# Patient Record
Sex: MALE | Race: WHITE | HISPANIC OR LATINO | Employment: FULL TIME | URBAN - METROPOLITAN AREA
[De-identification: names, ages, dates, MRNs, and addresses within clinical notes are randomized per-mention and may not be internally consistent; named-entity substitution may affect disease eponyms.]

---

## 2018-06-29 ENCOUNTER — HOSPITAL ENCOUNTER (EMERGENCY)
Facility: MEDICAL CENTER | Age: 28
End: 2018-06-29
Attending: EMERGENCY MEDICINE

## 2018-06-29 VITALS
TEMPERATURE: 98.5 F | HEIGHT: 72 IN | OXYGEN SATURATION: 100 % | DIASTOLIC BLOOD PRESSURE: 72 MMHG | BODY MASS INDEX: 16.01 KG/M2 | RESPIRATION RATE: 18 BRPM | SYSTOLIC BLOOD PRESSURE: 112 MMHG | HEART RATE: 99 BPM | WEIGHT: 118.17 LBS

## 2018-06-29 DIAGNOSIS — M25.572 ACUTE BILATERAL ANKLE PAIN: ICD-10-CM

## 2018-06-29 DIAGNOSIS — M25.571 ACUTE BILATERAL ANKLE PAIN: ICD-10-CM

## 2018-06-29 PROCEDURE — A9270 NON-COVERED ITEM OR SERVICE: HCPCS | Performed by: EMERGENCY MEDICINE

## 2018-06-29 PROCEDURE — 99284 EMERGENCY DEPT VISIT MOD MDM: CPT

## 2018-06-29 PROCEDURE — 700102 HCHG RX REV CODE 250 W/ 637 OVERRIDE(OP): Performed by: EMERGENCY MEDICINE

## 2018-06-29 RX ORDER — IBUPROFEN 600 MG/1
600 TABLET ORAL ONCE
Status: COMPLETED | OUTPATIENT
Start: 2018-06-29 | End: 2018-06-29

## 2018-06-29 RX ADMIN — IBUPROFEN 600 MG: 600 TABLET, FILM COATED ORAL at 13:15

## 2018-06-29 ASSESSMENT — PAIN SCALES - GENERAL
PAINLEVEL_OUTOF10: 6
PAINLEVEL_OUTOF10: 10

## 2018-06-29 NOTE — ED PROVIDER NOTES
"ED Provider Note    CHIEF COMPLAINT  Chief Complaint   Patient presents with   • Ankle Pain     bilateral ankle pain \"from walking so much\", denies trauma, no swelling/deformity noted       HPI  Leoncio Johnston is a 28 y.o. male who presents with bilateral ankle discomfort. The patient states over the last couple of days he's had bilateral ankle pain. He states he attributed this to walking around as he is currently homeless. He has not had any direct injury. He does not have any proximal leg pain. He is unaware of any fevers.    REVIEW OF SYSTEMS  No other musculoskeletal complaints    PHYSICAL EXAM  VITAL SIGNS: /82   Pulse (!) 102   Temp 37.1 °C (98.8 °F) (Temporal)   Resp 16   Ht 1.829 m (6')   Wt 53.6 kg (118 lb 2.7 oz)   SpO2 97%   BMI 16.03 kg/m²   In general patient does not appear toxic  Extremities the patient does not have any erythema nor ecchymosis surrounding the ankles. He does not have any swelling. He does have diffuse discomfort to palpation with no clear source. He does not have any skeletal deformities and he has full flexion and extension at the ankle with apparent discomfort but no loss of range of motion. He has bilateral normal midfoot and knee exams.  Skin no erythema nor induration  Neurovascular examination is grossly intact bilaterally      COURSE & MEDICAL DECISION MAKING  Pertinent Labs & Imaging studies reviewed. (See chart for details)  This a 28-year-old male who presents with bilateral ankle pain. I don't see a clear source. In further speaking with the patient I think this is more of a social issue as the patient wants a place to stay. Therefore we'll have social work evaluate the patient. The patient received Motrin for any possible inflammation. He is instructed to follow-up with the [facility] for routine health maintenance repeat examination next week.    FINAL IMPRESSION  1. Bilateral ankle pain  2. Homeless       Disposition  The patient will be discharged " in stable condition      Electronically signed by: Behzad Navarrete, 6/29/2018 1:12 PM

## 2018-06-29 NOTE — DISCHARGE PLANNING
CM met with pt to discuss needs. All pt would say is he wants 'a safe place'. He was very quiet and vague during conversation. He states family in area but they have distanced themselves from him. He does have family in [place] and would possibly like to go see them. CM [place]. He also states he has been at the homeless shelter but he doesn't feel safe there. CM offered other homeless resources but pt declined.

## 2018-06-29 NOTE — ED NOTES
On assessment the patient states that is both ankle are hurting.  He states that his ankles hurt from walking to much.

## 2018-06-29 NOTE — DISCHARGE INSTRUCTIONS
Ankle Pain  Many things can cause ankle pain, including an injury to the area and overuse of the ankle. The ankle joint holds your body weight and allows you to move around. Ankle pain can occur on either side or the back of one ankle or both ankles. Ankle pain may be sharp and burning or dull and aching. There may be tenderness, stiffness, redness, or warmth around the ankle.  Follow these instructions at home:  Activity  · Rest your ankle as told by your health care provider. Avoid any activities that cause ankle pain.  · Do exercises as told by your health care provider.  · Ask your health care provider if you can drive.  Using a brace, a bandage, or crutches  · If you were given a brace:  ¨ Wear it as told by your health care provider.  ¨ Remove it when you take a bath or a shower.  ¨ Try not to move your ankle very much, but wiggle your toes from time to time. This helps to prevent swelling.  · If you were given an elastic bandage:  ¨ Remove it when you take a bath or a shower.  ¨ Try not to move your ankle very much, but wiggle your toes from time to time. This helps to prevent swelling.  ¨ Adjust the bandage to make it more comfortable if it feels too tight.  ¨ Loosen the bandage if you have numbness or tingling in your foot or if your foot turns cold and blue.  · If you have crutches, use them as told by your health care provider. Continue to use them until you can walk without feeling pain in your ankle.  Managing pain, stiffness, and swelling  · Raise (elevate) your ankle above the level of your heart while you are sitting or lying down.  · If directed, apply ice to the area:  ¨ Put ice in a plastic bag.  ¨ Place a towel between your skin and the bag.  ¨ Leave the ice on for 20 minutes, 2-3 times per day.  General instructions  · Keep all follow-up visits as told by your health care provider. This is important.  · Record this information that may be helpful for you and your health care provider:  ¨ How  often you have ankle pain.  ¨ Where the pain is located.  ¨ What the pain feels like.  · Take over-the-counter and prescription medicines only as told by your health care provider.  Contact a health care provider if:  · Your pain gets worse.  · Your pain is not relieved with medicines.  · You have a fever or chills.  · You are having more trouble with walking.  · You have new symptoms.  Get help right away if:  · Your foot, leg, toes, or ankle tingles or becomes numb.  · Your foot, leg, toes, or ankle becomes swollen.  · Your foot, leg, toes, or ankle turns pale or blue.  This information is not intended to replace advice given to you by your health care provider. Make sure you discuss any questions you have with your health care provider.  Document Released: 06/07/2011 Document Revised: 08/18/2017 Document Reviewed: 07/19/2016  [employer] Interactive Patient Education © 2017 [employer]

## 2018-06-29 NOTE — ED TRIAGE NOTES
".  Chief Complaint   Patient presents with   • Ankle Pain     bilateral ankle pain \"from walking so much\", denies trauma, no swelling/deformity noted     ./82   Pulse (!) 102   Temp 37.1 °C (98.8 °F) (Temporal)   Resp 16   Ht 1.829 m (6')   Wt 53.6 kg (118 lb 2.7 oz)   SpO2 97%   BMI 16.03 kg/m²     Ambulatory to triage with above complaints, educated on triage process, placed in lobby, told to inform staff of any changes in condition.    "

## 2018-06-29 NOTE — ED NOTES
Waiting on Social Work consult before discharge per doctor.  Patient has been medicated and understands plan of care

## 2019-12-15 ENCOUNTER — HOSPITAL ENCOUNTER (EMERGENCY)
Facility: MEDICAL CENTER | Age: 29
End: 2019-12-15
Attending: EMERGENCY MEDICINE

## 2019-12-15 VITALS
OXYGEN SATURATION: 98 % | RESPIRATION RATE: 18 BRPM | SYSTOLIC BLOOD PRESSURE: 122 MMHG | TEMPERATURE: 98.2 F | DIASTOLIC BLOOD PRESSURE: 78 MMHG | HEART RATE: 77 BPM

## 2019-12-15 DIAGNOSIS — A64 STD (MALE): ICD-10-CM

## 2019-12-15 PROCEDURE — 99283 EMERGENCY DEPT VISIT LOW MDM: CPT

## 2019-12-16 NOTE — ED TRIAGE NOTES
Pt comes in reporting he was treated for a STD in [place] and here to follow up. Pt stating he feels better, denies symptoms now.

## 2019-12-16 NOTE — ED NOTES
Agree with triage note.     Received orders for DC. Denies any symptoms at all. Aware that if symptoms worsen or return then he should f/u with [facility]. VSS. Ambulatory to lobby.

## 2019-12-16 NOTE — DISCHARGE INSTRUCTIONS
Please return to the emergency department or follow-up with the primary care physician if you have worsening symptoms.

## 2019-12-16 NOTE — ED PROVIDER NOTES
ER Provider Note     Scribed for Jonathan Skaggs M.D. by Scooby Menon. 12/15/2019, 6:04 PM.    Primary Care Provider: None noted  Means of Arrival: Walk in   History obtained from: Patient  History limited by: None     CHIEF COMPLAINT  Chief Complaint   Patient presents with   • Follow-Up       HPI  Leoncio Johnston is a 29 y.o. male who presents to the Emergency Department for follow up after recently being treated for STDs while he was in [place]. Patient states he was treated for chlamydia and gonorrhea. States he received a shot and was prescribed a course of medication, which he thinks was doxycycline. He has finished the medication and states he is feeling much improved. States symptoms have overall resolved. Denies any dysuria or testicular pain. States he was advised to follow up with his PCP but came to the ED due to recently moving. He denies any other acute medical complaints at this time.     REVIEW OF SYSTEMS  See HPI for further details.    PAST MEDICAL HISTORY   has a past medical history of Anxiety and Psychiatric disorder.    SURGICAL HISTORY  patient denies any surgical history    SOCIAL HISTORY  Social History     Tobacco Use   • Smoking status: Current Every Day Smoker     Packs/day: 1.00     Types: Cigarettes   • Tobacco comment: 2 pack/week   Substance Use Topics   • Alcohol use: Yes     Comment: socially   • Drug use: No      Social History     Substance and Sexual Activity   Drug Use No       FAMILY HISTORY  No pertinent family history reported.     CURRENT MEDICATIONS  Home Medications     Reviewed by Tana Segura R.N. (Registered Nurse) on 12/15/19 at 1722  Med List Status: Complete   Medication Last Dose Status        Patient Dwayne Taking any Medications                       ALLERGIES  No Known Allergies    PHYSICAL EXAM  VITAL SIGNS: /82   Pulse 76   Temp 36.9 °C (98.4 °F) (Temporal)   Resp 16   SpO2 98%    Constitutional: Alert in no apparent distress.  HENT:  Normocephalic, Atraumatic, Bilateral external ears normal. Nose normal.   Eyes: Pupils are equal and reactive. Conjunctiva normal, non-icteric.   Heart: Regular rate and rythm, no murmurs.    Lungs: Clear to auscultation bilaterally.  Skin: Warm, Dry, No erythema, No rash.   Neurologic: Alert, Grossly non-focal.   Psychiatric: Affect normal, Judgment normal, Mood normal, Appears appropriate and not intoxicated.     COURSE & MEDICAL DECISION MAKING  Pertinent Labs & Imaging studies reviewed. (See chart for details)    This is a 29 y.o. male that presents with being treated for gonorrhea and chlamydia.  The patient symptoms have diminished significantly.  He denies any discharge, burning or pain.  At this time it appears as if his infection was treated adequately.  I will discharge home with follow-up and strict return precautions..     6:04 PM - Patient seen and examined at bedside. Patient was recently treated for chlamydia and gonorrhea while he was in [place]. Symptoms have resolved, and he is feeling significantly improved. Given that he has completed the appropriate treatment for the STDs, informed patient there are no further tests or interventions indicated at this time. The patient will be discharged. Instructions were given for follow-up. Discussed indications for seeking immediate medical attention. Patient was given the opportunity for questions. The patient understands and agrees.      The patient will return for new or worsening symptoms and is stable at the time of discharge.    The patient is referred to a primary physician for blood pressure management, diabetic screening, and for all other preventative health concerns.    DISPOSITION:  Patient will be discharged home in stable condition.    FOLLOW UP:  [address] [address]  [phone]  Go in 2 days  If symptoms worsen       FINAL IMPRESSION  1. STD (male)          Scooby GRANT (Scribe), lily scribing  for, and in the presence of, Jonathan Skaggs M.D..    Electronically signed by: Scooby Menon (Scribe), 12/15/2019    I, Jonathan Skaggs M.D. personally performed the services described in this documentation, as scribed by Scooby Menon in my presence, and it is both accurate and complete. E    The note accurately reflects work and decisions made by me.  Jonathan Skaggs  12/15/2019  11:04 PM

## 2019-12-27 ENCOUNTER — HOSPITAL ENCOUNTER (EMERGENCY)
Facility: MEDICAL CENTER | Age: 29
End: 2019-12-27
Attending: EMERGENCY MEDICINE

## 2019-12-27 VITALS
HEIGHT: 72 IN | SYSTOLIC BLOOD PRESSURE: 127 MMHG | HEART RATE: 91 BPM | WEIGHT: 125 LBS | DIASTOLIC BLOOD PRESSURE: 88 MMHG | RESPIRATION RATE: 18 BRPM | BODY MASS INDEX: 16.93 KG/M2 | OXYGEN SATURATION: 99 % | TEMPERATURE: 97.3 F

## 2019-12-27 DIAGNOSIS — Z20.2 STD EXPOSURE: ICD-10-CM

## 2019-12-27 DIAGNOSIS — J06.9 UPPER RESPIRATORY TRACT INFECTION, UNSPECIFIED TYPE: ICD-10-CM

## 2019-12-27 LAB
EKG IMPRESSION: NORMAL
FLUAV RNA SPEC QL NAA+PROBE: NEGATIVE
FLUBV RNA SPEC QL NAA+PROBE: NEGATIVE

## 2019-12-27 PROCEDURE — 700111 HCHG RX REV CODE 636 W/ 250 OVERRIDE (IP): Performed by: EMERGENCY MEDICINE

## 2019-12-27 PROCEDURE — 93005 ELECTROCARDIOGRAM TRACING: CPT

## 2019-12-27 PROCEDURE — 700102 HCHG RX REV CODE 250 W/ 637 OVERRIDE(OP): Performed by: EMERGENCY MEDICINE

## 2019-12-27 PROCEDURE — 93005 ELECTROCARDIOGRAM TRACING: CPT | Performed by: EMERGENCY MEDICINE

## 2019-12-27 PROCEDURE — 99284 EMERGENCY DEPT VISIT MOD MDM: CPT

## 2019-12-27 PROCEDURE — 87502 INFLUENZA DNA AMP PROBE: CPT

## 2019-12-27 PROCEDURE — A9270 NON-COVERED ITEM OR SERVICE: HCPCS | Performed by: EMERGENCY MEDICINE

## 2019-12-27 RX ORDER — ONDANSETRON 4 MG/1
4 TABLET, ORALLY DISINTEGRATING ORAL ONCE
Status: COMPLETED | OUTPATIENT
Start: 2019-12-27 | End: 2019-12-27

## 2019-12-27 RX ORDER — METRONIDAZOLE 500 MG/1
2000 TABLET ORAL ONCE
Status: COMPLETED | OUTPATIENT
Start: 2019-12-27 | End: 2019-12-27

## 2019-12-27 RX ADMIN — ONDANSETRON 4 MG: 4 TABLET, ORALLY DISINTEGRATING ORAL at 13:15

## 2019-12-27 RX ADMIN — METRONIDAZOLE 2000 MG: 500 TABLET ORAL at 13:15

## 2019-12-27 ASSESSMENT — LIFESTYLE VARIABLES
TOTAL SCORE: 0
HAVE YOU EVER FELT YOU SHOULD CUT DOWN ON YOUR DRINKING: NO
TOTAL SCORE: 0
DO YOU DRINK ALCOHOL: NO
HAVE PEOPLE ANNOYED YOU BY CRITICIZING YOUR DRINKING: NO
TOTAL SCORE: 0
EVER HAD A DRINK FIRST THING IN THE MORNING TO STEADY YOUR NERVES TO GET RID OF A HANGOVER: NO
EVER FELT BAD OR GUILTY ABOUT YOUR DRINKING: NO
CONSUMPTION TOTAL: INCOMPLETE

## 2019-12-27 NOTE — ED PROVIDER NOTES
ED Provider Note    CHIEF COMPLAINT  Chief Complaint   Patient presents with   • Chest Pain     congested, cough productive dark green; last 2 days   • Shortness of Breath     2days    • Exposure to STD     would like to be checked for trichomonos        HPI  Leoncio Johnston is a 29 y.o. male who presents to the emergency department with 2 complaints.  First the patient has had a sore throat cough productive with green sputum for the last 2 days but no fever.  In addition to this he said that his wife was diagnosed with trichomonas infection 3 days ago she is currently undergoing treatment and he would like to be treated because of his exposure.    REVIEW OF SYSTEMS no penile discharge or lesions.  No hemoptysis no vomiting or diarrhea no fever.  All other systems negative    PAST MEDICAL HISTORY  Past Medical History:   Diagnosis Date   • Anxiety    • Psychiatric disorder        FAMILY HISTORY  History reviewed. No pertinent family history.    SOCIAL HISTORY  Social History     Socioeconomic History   • Marital status: Single     Spouse name: Not on file   • Number of children: Not on file   • Years of education: Not on file   • Highest education level: Not on file   Occupational History   • Not on file   Social Needs   • Financial resource strain: Not on file   • Food insecurity:     Worry: Not on file     Inability: Not on file   • Transportation needs:     Medical: Not on file     Non-medical: Not on file   Tobacco Use   • Smoking status: Current Every Day Smoker     Packs/day: 1.00     Types: Cigarettes   • Smokeless tobacco: Never Used   • Tobacco comment: 2 pack/week   Substance and Sexual Activity   • Alcohol use: Yes     Comment: occ   • Drug use: No   • Sexual activity: Not on file   Lifestyle   • Physical activity:     Days per week: Not on file     Minutes per session: Not on file   • Stress: Not on file   Relationships   • Social connections:     Talks on phone: Not on file     Gets together: Not on  file     Attends Yazdanism service: Not on file     Active member of club or organization: Not on file     Attends meetings of clubs or organizations: Not on file     Relationship status: Not on file   • Intimate partner violence:     Fear of current or ex partner: Not on file     Emotionally abused: Not on file     Physically abused: Not on file     Forced sexual activity: Not on file   Other Topics Concern   • Not on file   Social History Narrative   • Not on file       SURGICAL HISTORY  History reviewed. No pertinent surgical history.    CURRENT MEDICATIONS  Home Medications     Reviewed by Kasey Stevens R.N. (Registered Nurse) on 12/27/19 at 1121  Med List Status: Complete   Medication Last Dose Status        Patient Dwayne Taking any Medications                       ALLERGIES  No Known Allergies    PHYSICAL EXAM  VITAL SIGNS: /88   Pulse 91   Temp 36.3 °C (97.3 °F) (Oral)   Resp 18   Ht 1.829 m (6')   Wt 56.7 kg (125 lb)   SpO2 99%   BMI 16.95 kg/m²    Oxygen saturation is interpreted as adequate  Constitutional: Awake nontoxic-appearing  HENT: Mucous membranes are moist and throat clear  Eyes: No erythema discharge or jaundice  Neck: There is tender bilateral lymphadenopathy no meningeal findings  Cardiovascular: Regular rate and rhythm  Lungs: Clear and equal bilaterally with no apparent difficulty breathing  Skin: Warm and dry  Musculoskeletal: No acute bony deformity  Neurologic: Awake verbal moving all those extremities    Laboratory  Influenza testing is negative    MEDICAL DECISION MAKING and DISPOSITION  In the emergency department the patient was given oral Zofran to prevent vomiting and then given 2 g of oral Flagyl for exposure to trichomonas.  His influenza testing is negative clinically he appears to have an upper respiratory tract infection.  At this point in time I think it is safe for him to go home I have advised him to call the [facility] clinic in the morning Monday and arrange a  primary care doctor and office follow-up next week.  If he feels he is developing new or worsening symptoms he is to return here    IMPRESSION  1.  STD exposure ( trichomonas)  2.  Upper respiratory tract infection         Electronically signed by: Vasquez Bearden, 12/27/2019 1:35 PM

## 2019-12-27 NOTE — DISCHARGE INSTRUCTIONS
Use Tylenol and Motrin if needed for fever or discomfort and drink lots of fluids to maintain hydration.  Return here if you experience new or worsening symptoms.

## 2019-12-27 NOTE — ED TRIAGE NOTES
Pt ambulated to triage with   Chief Complaint   Patient presents with   • Chest Pain     congested, cough productive dark green; last 2 days   • Shortness of Breath     2days    • Exposure to STD     would like to be checked for trichomonos      Called for EKG.  Pt Informed regarding triage process and verbalized understanding to inform triage tech or RN for any changes in condition. Placed in lobby.

## 2020-01-14 ENCOUNTER — HOSPITAL ENCOUNTER (EMERGENCY)
Facility: MEDICAL CENTER | Age: 30
End: 2020-01-14
Attending: EMERGENCY MEDICINE

## 2020-01-14 VITALS
RESPIRATION RATE: 16 BRPM | WEIGHT: 129.41 LBS | HEIGHT: 72 IN | BODY MASS INDEX: 17.53 KG/M2 | OXYGEN SATURATION: 99 % | SYSTOLIC BLOOD PRESSURE: 128 MMHG | DIASTOLIC BLOOD PRESSURE: 90 MMHG | HEART RATE: 88 BPM | TEMPERATURE: 97.5 F

## 2020-01-14 DIAGNOSIS — L03.031 PARONYCHIA OF GREAT TOE, RIGHT: ICD-10-CM

## 2020-01-14 DIAGNOSIS — L60.0 INGROWN TOENAIL: ICD-10-CM

## 2020-01-14 PROCEDURE — 99283 EMERGENCY DEPT VISIT LOW MDM: CPT

## 2020-01-14 PROCEDURE — 700102 HCHG RX REV CODE 250 W/ 637 OVERRIDE(OP): Performed by: EMERGENCY MEDICINE

## 2020-01-14 PROCEDURE — A9270 NON-COVERED ITEM OR SERVICE: HCPCS | Performed by: EMERGENCY MEDICINE

## 2020-01-14 RX ORDER — BUPIVACAINE HYDROCHLORIDE 5 MG/ML
10 INJECTION, SOLUTION EPIDURAL; INTRACAUDAL ONCE
Status: DISCONTINUED | OUTPATIENT
Start: 2020-01-14 | End: 2020-01-14 | Stop reason: HOSPADM

## 2020-01-14 RX ORDER — CEPHALEXIN 500 MG/1
500 CAPSULE ORAL ONCE
Status: COMPLETED | OUTPATIENT
Start: 2020-01-14 | End: 2020-01-14

## 2020-01-14 RX ORDER — CEPHALEXIN 500 MG/1
500 CAPSULE ORAL 3 TIMES DAILY
Qty: 21 CAP | Refills: 0 | Status: SHIPPED | OUTPATIENT
Start: 2020-01-14 | End: 2020-01-21

## 2020-01-14 RX ORDER — LIDOCAINE HYDROCHLORIDE 20 MG/ML
20 INJECTION, SOLUTION INFILTRATION; PERINEURAL ONCE
Status: DISCONTINUED | OUTPATIENT
Start: 2020-01-14 | End: 2020-01-14 | Stop reason: HOSPADM

## 2020-01-14 RX ADMIN — CEPHALEXIN 500 MG: 500 CAPSULE ORAL at 20:38

## 2020-01-15 NOTE — ED PROVIDER NOTES
"ED Provider Note    Scribed for Dr. Albin Corrales M.D. by Pietro Childs. 1/14/2020  7:52 PM    Primary care provider: Pcp Not In Computer  Means of arrival: Walk-in  History obtained from: Patient  History limited by: None    CHIEF COMPLAINT  Chief Complaint   Patient presents with   • Toe Pain       HPI  Leoncio Johnston is a 29 y.o. male who presents to the Emergency Department for acute, worsening right great toe pain with skin discoloration onset 2 days ago. Patient states that his toe suddenly \"turned green\" and is painful. There are no known alleviating or exacerbating factors. Denies any associated numbness/tingling. Patient denies any history of ingrown toenails or diabetes.     REVIEW OF SYSTEMS  Pertinent positives include right great toe pain and discoloration. Pertinent negatives include no numbness/tingling. As above, all other systems reviewed and are negative.   See HPI for further details.     PAST MEDICAL HISTORY   has a past medical history of Anxiety and Psychiatric disorder.    SURGICAL HISTORY  patient denies any surgical history    SOCIAL HISTORY  Social History     Tobacco Use   • Smoking status: Current Every Day Smoker     Packs/day: 1.00     Types: Cigarettes   • Smokeless tobacco: Never Used   • Tobacco comment: 2 pack/week   Substance Use Topics   • Alcohol use: Yes     Comment: occ   • Drug use: No      Social History     Substance and Sexual Activity   Drug Use No       FAMILY HISTORY  No family history on file.    CURRENT MEDICATIONS  Home Medications     Reviewed by Kun Box R.N. (Registered Nurse) on 01/14/20 at 1758  Med List Status: Not Addressed   Medication Last Dose Status   ARIPiprazole (ABILIFY PO)  Active                ALLERGIES  No Known Allergies    PHYSICAL EXAM  VITAL SIGNS: /92   Pulse 92   Temp 36.4 °C (97.5 °F) (Temporal)   Resp 16   Ht 1.829 m (6')   Wt 58.7 kg (129 lb 6.6 oz)   SpO2 99%   BMI 17.55 kg/m²     Constitutional: Well developed, " Well nourished, no acute distress, Non-toxic appearance.      Skin: Warm, Dry, No erythema, No rash.   Extremities:. Ingrown toenail to right great toe with associated erythema paronychia noted but no fluctuance. No cyanosis.   Musculoskeletal: No tenderness to palpation or major deformities noted.  Intact distal pulses  Neurologic: Awake, alert. Moves all extremities spontaneously.  Psychiatric: Affect normal, Judgment normal, Mood normal.     COURSE & MEDICAL DECISION MAKING  Pertinent Labs & Imaging studies reviewed. (See chart for details)    7:52 PM - Patient seen and examined at bedside. Patient is requesting to return at a later date to have his toenail removed, as he has to work tomorrow and his job requires closed toed shoes. I informed him that I would prescribed him Kelfex, and otherwise cleared him for discharge at this time. Patient was understanding and agreeable to plan of care.    Decision Making:  I advised definitive treatment of this with removal of toenail.  The patient initially wished to have this done but then decided that he would not be able to work the following day with shoes on which is required in his job.  He is going to return he states in a couple days when he has some time off.  He does not wish the toenail removal to be done today.  I have gone ahead and prescribe some Keflex since he is got a significant paronychia, although I think the underlying problem is still ingrown nail which would benefit from removal    The patient will return for new or worsening symptoms and is stable at the time of discharge.    The patient is referred to a primary physician for blood pressure management, diabetic screening, and for all other preventative health concerns.    DISPOSITION:  Patient will be discharged home in stable condition.    FOLLOW UP:  No follow-up provider specified.    OUTPATIENT MEDICATIONS:  Discharge Medication List as of 1/14/2020  8:24 PM      START taking these medications     Details   cephALEXin (KEFLEX) 500 MG Cap Take 1 Cap by mouth 3 times a day for 7 days., Disp-21 Cap, R-0, Print Rx Paper             FINAL IMPRESSION  1. Ingrown toenail    2. Paronychia of great toe, right         IAlbin M.D. personally performed the services described in this documentation, as scribed by Pietro Childs in my presence, and it is both accurate and complete.    C.    The note accurately reflects work and decisions made by me.  Albin Corrales M.D.  1/14/2020  9:34 PM

## 2020-01-15 NOTE — DISCHARGE INSTRUCTIONS
You can return at any time if you wish the toenail removed, will probably not be able to wear shoes for couple days afterwards

## 2020-01-15 NOTE — ED NOTES
Pt ambulatory to room with steady gait. Attached to monitor, changed into gown, call bell in reach.   Agree with triage note.

## 2020-01-15 NOTE — ED NOTES
Pt medicated per MAR. Pt discharged home. Explained discharge and medication instructions. Questions and comments addressed. Pt verbalized understanding of instructions. Pt advised to follow-up with PCP or return to ED for any new or worsening of symptoms. Pt is ambulating well and steady on feet. VS stable.

## 2020-01-15 NOTE — ED TRIAGE NOTES
"28 y/o male ambulatory to triage with c/o pain to his right great toe. Pt states \"it's turning green\". Discoloration noted to the toenail. Pt denies injury.   "

## 2020-01-17 ENCOUNTER — HOSPITAL ENCOUNTER (EMERGENCY)
Facility: MEDICAL CENTER | Age: 30
End: 2020-01-17
Attending: EMERGENCY MEDICINE

## 2020-01-17 VITALS
HEART RATE: 75 BPM | BODY MASS INDEX: 17.08 KG/M2 | RESPIRATION RATE: 14 BRPM | OXYGEN SATURATION: 96 % | DIASTOLIC BLOOD PRESSURE: 77 MMHG | WEIGHT: 126.1 LBS | SYSTOLIC BLOOD PRESSURE: 113 MMHG | HEIGHT: 72 IN | TEMPERATURE: 98.6 F

## 2020-01-17 DIAGNOSIS — L60.0 INGROWN TOENAIL: ICD-10-CM

## 2020-01-17 DIAGNOSIS — B35.1 TINEA UNGUIUM: ICD-10-CM

## 2020-01-17 PROCEDURE — 99284 EMERGENCY DEPT VISIT MOD MDM: CPT

## 2020-01-17 RX ORDER — ARIPIPRAZOLE 5 MG/1
5 TABLET ORAL DAILY
Qty: 30 TAB | Refills: 0 | Status: SHIPPED | OUTPATIENT
Start: 2020-01-17 | End: 2020-10-01

## 2020-01-17 NOTE — ED TRIAGE NOTES
"30 y/o male ambulatory to triage with c/o toe pain. Pt states he was seen here the other day for the same, he was told that the toe nail needs to be removed. Pt states he was given an antibiotic here (one tab) but did not receive a prescription for the medication. Pt also requesting a refill of his antipsychotic medication. 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   "

## 2020-01-17 NOTE — ED PROVIDER NOTES
ED Provider Note    Scribed for Michael Dyson M.D. by Casper Francois. 1/17/2020, 1:31 PM.    Primary care provider: Pcp Not In Computer  Means of arrival: Walk in  History obtained from: Patient  History limited by: None    CHIEF COMPLAINT  Chief Complaint   Patient presents with   • Toe Pain   • Off Psych Meds     x 30 days       HPI  Leoncio Johnston is a 29 y.o. male who presents to the Emergency Department for a prescription refill. The patient states that he needs a refill on his Abilify 5 mg. He was prescribed Abilify for psychosis. He notes that he just moved here from [place] and does not have a doctor here to fill the prescription. The last time he saw the prescribing doctor was two months go. The patient also endorses right great toe pain, but denies any fever or chills. The pain is worsened when he is walking. No alleviating factors were stated.     REVIEW OF SYSTEMS  See HPI above.     PAST MEDICAL HISTORY   has a past medical history of Anxiety, Psychiatric disorder, and Psychosis (HCC).    SURGICAL HISTORY  patient denies any surgical history    SOCIAL HISTORY  Social History     Tobacco Use   • Smoking status: Current Every Day Smoker     Packs/day: 1.00     Types: Cigarettes   • Smokeless tobacco: Never Used   • Tobacco comment: 2 pack/week   Substance Use Topics   • Alcohol use: Yes     Comment: occ   • Drug use: No      Social History     Substance and Sexual Activity   Drug Use No       FAMILY HISTORY  History reviewed. No pertinent family history.    CURRENT MEDICATIONS  Home Medications     Reviewed by Kun Box R.N. (Registered Nurse) on 01/17/20 at 1226  Med List Status: <None>   Medication Last Dose Status   ARIPiprazole (ABILIFY PO)  Active   cephALEXin (KEFLEX) 500 MG Cap  Active                ALLERGIES  No Known Allergies    PHYSICAL EXAM  VITAL SIGNS: /80   Pulse (!) 102   Temp 37 °C (98.6 °F) (Oral)   Resp 16   Ht 1.829 m (6')   Wt 57.2 kg (126 lb 1.7 oz)   SpO2  98%   BMI 17.10 kg/m²     Constitutional: Well developed, Well nourished, No acute distress, Non-toxic appearance.     Skin: Warm, Dry, No erythema,   Back: No tenderness, No CVA tenderness.  Musculoskeletal: Good range of motion in all major joints. No tenderness to palpation or major deformities noted. Intact distal pulses, no clubbing, no cyanosis, no edema, patient's right great toe on the lateral side there is a slight ingrown toenail.  The patient does have some tinea unguium but no signs of significant erythema or other abnormalities.  Neurologic: Alert & oriented x 3, Moving all extremities. No gross abnormalities.    Psychiatric: Affect normal, Judgment normal, Mood normal.     COURSE & MEDICAL DECISION MAKING  Pertinent Labs & Imaging studies reviewed. (See chart for details)    1:31 PM - Patient seen and examined at bedside. I told the patient that is exam reveals that he has an ingrown toenail as well as a foot fungus. He should soak his toe two times a day and establish a PCP to hand the toe fungus and the Abilify. Patient was given chance to ask questions.      Decision Making:  Patient does have tinea unguium to his toe with what he is most concerned about.  Explained to the patient the treatment for this would be a long-term treatment with oral antifungals.  But with the slight ingrown toenail recommended spacers to his toe he should soak his toe in warm water 2 times a day and allow for the rest of the toenail to grow out.  Should there be any increasing redness or abnormalities such as an infection he should return back to the ED for reevaluation.  Patient also requested a refill on his Abilify.  Clinically the patient otherwise appears well I recommended for the patient to follow-up with outpatient psychiatry and primary I will have our [facility] people give the patient instructions.     The patient will return for new or worsening symptoms and is stable at the time of  discharge.    DISPOSITION:  Patient will be discharged home in stable condition.    FOLLOW UP:  [address] [id number] [phone]        [address] [address]  [phone]          OUTPATIENT MEDICATIONS:  Discharge Medication List as of 1/17/2020  2:52 PM            FINAL IMPRESSION  1. Ingrown toenail    2. Tinea unguium          ICasper (Scribe), am scribing for, and in the presence of, Michael Dyson M.D..    Electronically signed by: Casper Francois (Scribe), 1/17/2020    IMichael M.D. personally performed the services described in this documentation, as scribed by Casper Francois in my presence, and it is both accurate and complete.  E  The note accurately reflects work and decisions made by me.  Michael Dyson M.D.  1/17/2020  8:25 PM

## 2020-01-17 NOTE — ED NOTES
Pt states that he understands to use resources provided to him to obtain prescription on a regular basis. Patient also verbalizes understanding of the discharge instructions

## 2020-01-17 NOTE — DISCHARGE PLANNING
ALERT team  note:  Per [facility] Dr. Dyson's request, writer RN reviewed community MH resources with pt, with written information given, including [facility], [facility], [facility] health clinic; no active insurance plan; pt verbalized understanding; no SI, HI, or self-harm ideation noted; writer RN updated Dr. Dyson  Pt received RX for Ability 5 mg PO daily

## 2020-01-17 NOTE — ED NOTES
Patient states he is out of abilify and needs a refill, great toe on right foot, patient states toenail need removal

## 2020-06-05 ENCOUNTER — HOSPITAL ENCOUNTER (EMERGENCY)
Facility: MEDICAL CENTER | Age: 30
End: 2020-06-05
Attending: EMERGENCY MEDICINE

## 2020-06-05 VITALS
TEMPERATURE: 97.5 F | BODY MASS INDEX: 16.87 KG/M2 | RESPIRATION RATE: 18 BRPM | SYSTOLIC BLOOD PRESSURE: 110 MMHG | DIASTOLIC BLOOD PRESSURE: 68 MMHG | WEIGHT: 124.56 LBS | OXYGEN SATURATION: 98 % | HEART RATE: 94 BPM | HEIGHT: 72 IN

## 2020-06-05 DIAGNOSIS — Z76.0 MEDICATION REFILL: ICD-10-CM

## 2020-06-05 DIAGNOSIS — L71.9 ROSACEA: ICD-10-CM

## 2020-06-05 PROCEDURE — 99282 EMERGENCY DEPT VISIT SF MDM: CPT

## 2020-06-05 RX ORDER — TRIAMCINOLONE ACETONIDE 0.25 MG/G
CREAM TOPICAL
Qty: 15 G | Refills: 0 | Status: SHIPPED | OUTPATIENT
Start: 2020-06-05 | End: 2020-10-01

## 2020-06-05 SDOH — HEALTH STABILITY: MENTAL HEALTH: HOW OFTEN DO YOU HAVE 6 OR MORE DRINKS ON ONE OCCASION?: LESS THAN MONTHLY

## 2020-06-05 SDOH — HEALTH STABILITY: MENTAL HEALTH: HOW OFTEN DO YOU HAVE A DRINK CONTAINING ALCOHOL?: 2-3 TIMES A WEEK

## 2020-06-05 SDOH — HEALTH STABILITY: MENTAL HEALTH: HOW MANY STANDARD DRINKS CONTAINING ALCOHOL DO YOU HAVE ON A TYPICAL DAY?: 1 OR 2

## 2020-06-05 NOTE — ED PROVIDER NOTES
ED Provider Note    CHIEF COMPLAINT  Chief Complaint   Patient presents with   • Medication Refill     Needs Trimelizone cream prescription refill; pt states he has been using this for rosacea since he was 14 y.o.       HPI  Leoncio Johnston is a 30 y.o. male who presents requesting a refill for his triamcinolone facial cream.  Has been using this for nearly 2 decades for rosacea.  He gets a rash across his face that gets worse if he is in the sun or if he is gets very hot.  Patient has recently left and relocated to [place].  Is not yet established primary care doctor.  He presents here hoping for refill of his triamcinolone.  He denies any fever or chills.  There is no other complaint.    PAST MEDICAL HISTORY  Past Medical History:   Diagnosis Date   • Anxiety    • Psychiatric disorder    • Psychosis (HCC)    • Rosacea        FAMILY HISTORY  History reviewed. No pertinent family history.    SOCIAL HISTORY  Social History     Tobacco Use   • Smoking status: Current Every Day Smoker     Packs/day: 1.00     Types: Cigarettes   • Smokeless tobacco: Current User   • Tobacco comment: 2 pack/week   Substance Use Topics   • Alcohol use: Yes     Frequency: 2-3 times a week     Drinks per session: 1 or 2     Binge frequency: Less than monthly   • Drug use: No         SURGICAL HISTORY  History reviewed. No pertinent surgical history.    CURRENT MEDICATIONS    I have reviewed the nurses notes and/or the list brought with the patient.    ALLERGIES  No Known Allergies    REVIEW OF SYSTEMS  See HPI for further details. Review of systems as above, facial rash    PHYSICAL EXAM  VITAL SIGNS: /68   Pulse 94   Temp 36.4 °C (97.5 °F)   Resp 18   Ht 1.829 m (6')   Wt 56.5 kg (124 lb 9 oz)   SpO2 98%   BMI 16.89 kg/m²     Constitutional: Well appearing patient in no acute distress.  Not toxic, nor ill in appearance.  HENT: Mucus membranes moist.  Oropharynx is clear.  There is some slight scaling erythema over his nose and  his maxillary face bilaterally    MEDICAL RECORD  I have reviewed patient's medical record and pertinent results are listed above.    COURSE & MEDICAL DECISION MAKING  I have reviewed any medical record information, laboratory studies and radiographic results as noted above.  Patient presents with some redness across his face which could be consistent with rosacea which is what he says he has had since he was 14.  He is been using steroid cream since that time for flares of rosacea.  He thinks he is having a flare now.  I think it is reasonable to go ahead and refill his steroid cream.  He is uses quite sparingly.  It is essential that he establish with a primary care doctor here in [facility] [address].  Specifically recommended the [facility] clinic and the [facility].  Instructions on rosacea.    FINAL IMPRESSION  1. Rosacea    2. Medication refill           This dictation was created using voice recognition software.    Electronically signed by: Zeke Couch M.D., 6/5/2020 11:25 AM

## 2020-06-05 NOTE — ED TRIAGE NOTES
Chief Complaint   Patient presents with   • Medication Refill     Needs Trimelizone cream prescription refill; pt states he has been using this for rosacea since he was 14 y.o.       Pt ambulatory to triage for above complaint. Pt states his PCM is in [place] and he just moved here a couple months ago so he does not have a local doctor to refill his meds.     Pt is alert/oriented and follows commands. Pt speaking in full sentences and responds appropriately to questions. No acute distress noted in triage and respirations are even and unlabored.     Pt placed in lobby and educated on triage process. Pt encouraged to alert staff for any changes in condition.

## 2020-10-01 ENCOUNTER — HOSPITAL ENCOUNTER (EMERGENCY)
Facility: MEDICAL CENTER | Age: 30
End: 2020-10-01
Attending: EMERGENCY MEDICINE
Payer: MEDICAID

## 2020-10-01 VITALS
RESPIRATION RATE: 17 BRPM | SYSTOLIC BLOOD PRESSURE: 132 MMHG | BODY MASS INDEX: 16.93 KG/M2 | HEART RATE: 84 BPM | TEMPERATURE: 96.9 F | DIASTOLIC BLOOD PRESSURE: 80 MMHG | WEIGHT: 125 LBS | OXYGEN SATURATION: 98 % | HEIGHT: 72 IN

## 2020-10-01 DIAGNOSIS — R25.1 EPISODE OF SHAKING: ICD-10-CM

## 2020-10-01 DIAGNOSIS — R25.1 OCCASIONAL TREMORS: ICD-10-CM

## 2020-10-01 DIAGNOSIS — Z76.0 MEDICATION REFILL: ICD-10-CM

## 2020-10-01 PROCEDURE — 99283 EMERGENCY DEPT VISIT LOW MDM: CPT

## 2020-10-01 RX ORDER — TRIAMCINOLONE ACETONIDE 0.25 MG/G
1 CREAM TOPICAL DAILY
Qty: 15 G | Refills: 1 | Status: SHIPPED | OUTPATIENT
Start: 2020-10-01

## 2020-10-01 RX ORDER — TRIAMCINOLONE ACETONIDE 0.25 MG/G
1 CREAM TOPICAL DAILY
COMMUNITY
End: 2020-10-01 | Stop reason: SDUPTHER

## 2020-10-01 NOTE — ED TRIAGE NOTES
"Pt ambulatory to room with ems c/o feeling as thought he is going to have a sz. Pt states had a sz approx 5 years ago when he \"passed out\" at table. Pt states has never seen neurologist or taken meds for this. Pt denies pain. Nad. GCS15  "

## 2020-10-01 NOTE — ED PROVIDER NOTES
ED Provider Note    Scribed for Jamie Goff M.D. by Amber David. 10/1/2020  7:38 AM    Primary care provider: Pcp Not In Computer  Means of arrival: EMS  History obtained from: EMS and Patient  History limited by: None    CHIEF COMPLAINT  Chief Complaint   Patient presents with   • Other       HPI  Leoncio Johnston is a 30 y.o. male who presents to the Emergency Department for evaluation following one episode seizure-like activity this morning while he was drinking coffee 30 minutes ago. He endorses associated shaking and trembling in his bilateral legs, and feeling near syncopal. He denies any fatigue. 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 He reports a history of seizure 5 years ago. He suspects his symptoms may be related to the weather being cooler recently. No alleviating factors attempted. The patient does not take any daily medications.     REVIEW OF SYSTEMS  Pertinent positives include seizure-like, shaking and trembling in bilateral legs, and near syncopal.   Pertinent negatives include no fatigue.    All other systems reviewed and negative. See HPI for further details.       PAST MEDICAL HISTORY   has a past medical history of Anxiety, Psychiatric disorder, Psychosis (HCC), and Rosacea.    SURGICAL HISTORY  patient denies any surgical history    SOCIAL HISTORY  Social History     Tobacco Use   • Smoking status: Current Every Day Smoker     Packs/day: 1.00     Types: Cigarettes   • Smokeless tobacco: Current User   • Tobacco comment: 2 pack/week   Substance Use Topics   • Alcohol use: Yes     Frequency: 2-3 times a week     Drinks per session: 1 or 2     Binge frequency: Less than monthly   • Drug use: No      Social History     Substance and Sexual Activity   Drug Use No       FAMILY HISTORY  History reviewed. No pertinent family history.    CURRENT MEDICATIONS  Home Medications     Reviewed by Lori Reina (Pharmacy Tech) on 10/01/20 at 0805   Med List Status: Complete   Medication Last Dose Status   triamcinolone acetonide (KENALOG) 0.025 % Cream 9/27/2020 Active                ALLERGIES  No Known Allergies    PHYSICAL EXAM  VITAL SIGNS: /76   Pulse 85   Temp 36.1 °C (96.9 °F) (Temporal)   Resp 16   Ht 1.829 m (6')   Wt 56.7 kg (125 lb)   SpO2 97%   BMI 16.95 kg/m²     Nursing note and vitals reviewed.  Constitutional: Well-developed. Thin, No distress.   HENT: Head is normocephalic and atraumatic. Oropharynx is clear and moist without exudate or erythema.   Eyes: Pupils are equal, round, and reactive to light. Conjunctiva are normal.   Cardiovascular: Normal rate and regular rhythm. No murmur heard. Normal radial pulses.  Pulmonary/Chest: Breath sounds normal. No wheezes or rales.   Abdominal: Soft and non-tender. No distention    Musculoskeletal: Extremities exhibit normal range of motion without edema or tenderness.   Neurological: Awake, alert and oriented to person, place, and time. No focal deficits noted.  Skin: Skin is warm and dry. No rash.   Psychiatric: Normal mood and affect. Appropriate for clinical situation.    COURSE & MEDICAL DECISION MAKING  Nursing notes, VS, PMSFHx reviewed in chart.     Review of past medical records shows the patient was here June 2020 for a medication refill. He has a history of psychiatric disease.     7:38 AM - Patient seen and examined at bedside. He is in no distress, and no pain. His history is not consistent with seizure activity, and he has become tremulous for unclear reasons. Patient suspects it may be due to the cold weather outside, and that he could have just been shivering. I updated him on the plan of care including PO challenge. I advised him of all return precautions. Patient verbalizes understanding and agreement to this plan of care. Patient is requesting a refill of Kenalog cream. He will be sent home with this prescription.     The patient will return for new or worsening symptoms  and is stable at the time of discharge.    DISPOSITION:  Patient will be discharged home in stable condition.    FOLLOW UP:  [facility], Emergency Dept  [address] [facility] [address] [phone]    If symptoms worsen    [facility]  [address] [address]  [phone]  Schedule an appointment as soon as possible for a visit         FINAL IMPRESSION  1. Occasional tremors    2. Episode of shaking    3. Medication refill          IAmber (Leeibleanna), am scribing for, and in the presence of, Jamie Goff M.D..    Electronically signed by: Amber Hitchcock), 10/1/2020    Jamie GRANT M.D. personally performed the services described in this documentation, as scribed by Amber David in my presence, and it is both accurate and complete. C.    The note accurately reflects work and decisions made by me.  Jamie Goff M.D.  10/1/2020  11:19 AM

## 2020-10-14 VITALS
RESPIRATION RATE: 18 BRPM | SYSTOLIC BLOOD PRESSURE: 103 MMHG | OXYGEN SATURATION: 96 % | HEART RATE: 96 BPM | BODY MASS INDEX: 16.93 KG/M2 | DIASTOLIC BLOOD PRESSURE: 72 MMHG | TEMPERATURE: 98.6 F | HEIGHT: 72 IN | WEIGHT: 125 LBS

## 2020-10-14 PROCEDURE — 93005 ELECTROCARDIOGRAM TRACING: CPT

## 2020-10-14 PROCEDURE — 93005 ELECTROCARDIOGRAM TRACING: CPT | Performed by: EMERGENCY MEDICINE

## 2020-10-14 PROCEDURE — 99284 EMERGENCY DEPT VISIT MOD MDM: CPT

## 2020-10-15 ENCOUNTER — HOSPITAL ENCOUNTER (EMERGENCY)
Facility: MEDICAL CENTER | Age: 30
End: 2020-10-15
Attending: EMERGENCY MEDICINE
Payer: MEDICAID

## 2020-10-15 DIAGNOSIS — R44.0 HEARING VOICES: ICD-10-CM

## 2020-10-15 DIAGNOSIS — Z76.0 MEDICATION REFILL: ICD-10-CM

## 2020-10-15 LAB — EKG IMPRESSION: NORMAL

## 2020-10-15 PROCEDURE — 700102 HCHG RX REV CODE 250 W/ 637 OVERRIDE(OP): Performed by: EMERGENCY MEDICINE

## 2020-10-15 PROCEDURE — A9270 NON-COVERED ITEM OR SERVICE: HCPCS | Performed by: EMERGENCY MEDICINE

## 2020-10-15 RX ORDER — HALOPERIDOL 5 MG/1
5 TABLET ORAL ONCE
Status: COMPLETED | OUTPATIENT
Start: 2020-10-15 | End: 2020-10-15

## 2020-10-15 RX ADMIN — HALOPERIDOL 5 MG: 5 TABLET ORAL at 01:45

## 2020-10-15 NOTE — ED PROVIDER NOTES
ED Provider Note    Scribed for Kannan Sim M.D. by Alphonso Franco. 10/15/2020  1:15 AM    CHIEF COMPLAINT  Chief Complaint   Patient presents with   • Tingling     bilateral hands x 30 min PTA via EMS. CMS intact. Denies cardiac Hx/stroke   • Chest Pain     x last 3 seconds in triage per pt       HPI  Leoncio Johnston is a 30 y.o. male who presents to the ED via ambulance for a refill of Aripiprazole. He notes he has been prescribed it before for psychosis symptoms, but he has been out for over a month. He is unsure of how much he was on before. Patient reports auditory hallucinations (relatively new). Patient denies any visual hallucinations, SI, HI, current chest pain, vomiting, or diarrhea. He denies any alcohol or drug use. He denies any history of Diabetes or blood pressure issues.     Per EMS, patient called an ambulance for evaluation of tingling in bilateral hands 30 minutes prior to arrival. Per EMS, CMS is intact and patient denied cardiac history or history of a stroke. Patient also complained of chest pain lasting 3 seconds in triage, but denies any chest pain in the ED room. Patient reports he is only here for medication refill.     I verified that the patient was wearing a mask and I was wearing appropriate PPE every time I entered the room. The patient's mask was on the patient at all times during my encounter.    REVIEW OF SYSTEMS  See HPI for further details. All other systems are negative.     PAST MEDICAL HISTORY   has a past medical history of Anxiety, Psychiatric disorder, Psychosis (HCC), and Rosacea.    SOCIAL HISTORY  Social History     Tobacco Use   • Smoking status: Current Every Day Smoker     Packs/day: 1.00     Types: Cigarettes   • Smokeless tobacco: Current User   • Tobacco comment: 2 pack/week   Substance and Sexual Activity   • Alcohol use: Yes     Frequency: 2-3 times a week     Drinks per session: 1 or 2      Binge frequency: Less than monthly   • Drug use: No   • Sexual activity: Not reported       SURGICAL HISTORY  patient denies any surgical history    CURRENT MEDICATIONS  Current Outpatient Medications   Medication Instructions   • triamcinolone acetonide (KENALOG) 0.025 % Cream 1 Application, Topical, DAILY       ALLERGIES  No Known Allergies    PHYSICAL EXAM  VITAL SIGNS: /72   Pulse 96   Temp 37 °C (98.6 °F) (Temporal)   Resp 18   Ht 1.829 m (6')   Wt 56.7 kg (125 lb)   SpO2 96%   BMI 16.95 kg/m²    Pulse ox interpretation: I interpret this pulse ox as normal.  Genl: M walking around room, speaking clearly, appears in no acute distress. Non-toxic appearing. Not responding to internal stimuli. Copious amounts of marijuana smelled in the room.   Head: NC/AT   ENT: Mucous membranes moist, posterior pharynx clear, uvula midline, nares patent bilaterally  Eyes: Normal sclera, pupils equal round reactive to light  Neck: Supple, FROM, no LAD appreciated  Pulmonary: Lungs are clear to auscultation bilaterally  Chest: No TTP  CV:  RRR, no murmur appreciated, pulses 2+ in both upper and lower extremities,  Abdomen: soft, NT/ND; no rebound/guarding, no masses palpated, no HSM  : no CVA or suprapubic tenderness  Musculoskeletal: Pain free ROM of the neck. Moving upper and lower extremities and spontaneous in coordinated fashion  Neuro: A&Ox4 (person, place, time, situation), speech fluent, gait steady, no focal deficits appreciated, No cerebellar signs.  Sensation is grossly intact in the distal upper and lower extremities.  5/5 strength in  and dorsiflexion/plantar flexion of the ankles  Psych: No SI or HI. Intermittently hearing voices but not actively present. Forward thinking. Normal speech. No pressured voice. No tangentiality.   Skin: No rash or lesions.  No pallor or jaundice.  No cyanosis.  Warm and dry.    DIAGNOSTIC STUDIES / PROCEDURES    EKG  Results for orders placed or performed during  the hospital encounter of 10/15/20   EKG   Result Value Ref Range    Report       [facility] Emergency Dept.    Test Date:  2020-10-14  Pt Name:    CLAYTON RUELAS               Department: ER  MRN:        [id number]                      Room:  Gender:     Male                         Technician: [id number]  :        1990                   Requested By:ER TRIAGE PROTOCOL  Order #:    [id number]                    Reading MD: Roney Brunner MD    Measurements  Intervals                                Axis  Rate:       81                           P:          82  WY:         136                          QRS:        66  QRSD:       94                           T:          75  QT:         356  QTc:        414    Interpretive Statements  SINUS RHYTHM  PROBABLE LEFT ATRIAL ABNORMALITY  RSR' IN V1 OR V2, PROBABLY NORMAL VARIANT  Compared to ECG 2019 11:21:23  T-wave abnormality no longer present  Electronically Signed On 10- 1:30:10 PDT by Roney Brunner MD        EKG interpreted by me as above.     COURSE & MEDICAL DECISION MAKING  Pertinent Labs & Imaging studies reviewed. (See chart for details)    Differential diagnoses include but not limited to: Medication refill, psychosis, medication noncompliance, anxiety, hyperventilation, ACS unlikely    1:15 AM - Patient seen and examined at bedside. Patient will be treated with Haldol 5 mg tab. Ordered EKG to evaluate his symptoms. Patient is informed of the plan for discharge following intervention. The patient will not drink alcohol nor drive with prescribed medications. The patient will return for worsening symptoms and is stable at the time of discharge. The patient verbalizes understanding and will comply.     Medical Decision Making:   Presents the emergency room for symptoms as described above.  The the patient had called EMS for peripheral tingling sensations but on my initial assessment the patient appears to be smoking marijuana  intermittently using a vape pen, he has trouble remembering when he came to the emergency department in the first place but endorses that he has had chronic auditory hallucinations for some time.  The patient is has a normal peripheral nerve exam, he does not have any recreational chest discomfort and is not currently in any respiratory distress nor is he complaining of any acute chest pain.  EKG had that done in triage and is unchanged from prior, he does not have any signs of acute ischemia overall symptomology is unlikely to be ACS in nature.    He has reassuring vital signs, he does not have a cough or any other concerning concrement symptomology.  Review of the chart shows has been seen on several different occurrences does have a history of underlying psychiatric condition.  He comes in oftentimes requesting Abilify.  With all this medication for some time.  With his low-grade delusion/auditory hallucination patient will be given oral tablet of Haldol.  During this period of observation he is noted to be doing well, not responding to internal stimuli and is otherwise safe.  I had  come to the bedside to give him resources on establishing outpatient psychiatric care which he is amenable to.  I do not feel comfortable doing any new prescriptions I will not be able to follow him up in the outpatient clinic and he will referred to the outpatient physician for ongoing medication management.      DISPOSITION:  Patient will be discharged home in stable condition.    FOLLOW UP:  [facility], Emergency Dept  [address] [facility] [id number] [phone]    If symptoms worsen    [facility] [address] [id number] [phone]  Schedule an appointment as soon as possible for a visit       [address] [address]  [phone]  Schedule an appointment as soon as possible for a visit         FINAL  IMPRESSION  Visit Diagnoses     ICD-10-CM   1. Medication refill  Z76.0   2. Hearing voices  R44.0        IAlphonso (Leeibe), am scribing for, and in the presence of, Kannan Sim M.D..    Electronically signed by: Alphonso Franco (Yashira), 10/15/2020    Kannan GRANT M.D. personally performed the services described in this documentation, as scribed by Alphonso Franco in my presence, and it is both accurate and complete. E.    The note accurately reflects work and decisions made by me.  Kannan Sim M.D.  10/15/2020  2:45 AM

## 2020-10-15 NOTE — DISCHARGE PLANNING
Thi pt denies any si or hi but claims he has been having some intermittent auditory hallucinations. He smells heavily of marijuana smoke and it was noted he may have been puffing on a marijuana VAP pen in the er. He was given op referrals and par will give information on financial assistance and how to apply for medicaid. [facility] was directed to services at [facility].

## 2020-10-15 NOTE — ED NOTES
Discharge instructions discussed with pt. Pt verbalized understanding. Pt discharged ambulatory. Provided with work note.